# Patient Record
Sex: FEMALE | Race: OTHER | NOT HISPANIC OR LATINO
[De-identification: names, ages, dates, MRNs, and addresses within clinical notes are randomized per-mention and may not be internally consistent; named-entity substitution may affect disease eponyms.]

---

## 2022-04-27 ENCOUNTER — TRANSCRIPTION ENCOUNTER (OUTPATIENT)
Age: 77
End: 2022-04-27

## 2022-06-03 DIAGNOSIS — Z86.718 PERSONAL HISTORY OF OTHER VENOUS THROMBOSIS AND EMBOLISM: ICD-10-CM

## 2022-06-08 ENCOUNTER — APPOINTMENT (OUTPATIENT)
Dept: VASCULAR SURGERY | Facility: CLINIC | Age: 77
End: 2022-06-08

## 2022-06-08 ENCOUNTER — NON-APPOINTMENT (OUTPATIENT)
Age: 77
End: 2022-06-08

## 2022-06-08 ENCOUNTER — APPOINTMENT (OUTPATIENT)
Dept: HEART AND VASCULAR | Facility: CLINIC | Age: 77
End: 2022-06-08
Payer: MEDICARE

## 2022-06-08 VITALS — DIASTOLIC BLOOD PRESSURE: 80 MMHG | HEART RATE: 81 BPM | SYSTOLIC BLOOD PRESSURE: 122 MMHG

## 2022-06-08 VITALS
HEART RATE: 90 BPM | RESPIRATION RATE: 16 BRPM | WEIGHT: 200 LBS | TEMPERATURE: 97.4 F | DIASTOLIC BLOOD PRESSURE: 80 MMHG | OXYGEN SATURATION: 96 % | SYSTOLIC BLOOD PRESSURE: 114 MMHG

## 2022-06-08 DIAGNOSIS — Z86.79 PERSONAL HISTORY OF OTHER DISEASES OF THE CIRCULATORY SYSTEM: ICD-10-CM

## 2022-06-08 DIAGNOSIS — Z80.0 FAMILY HISTORY OF MALIGNANT NEOPLASM OF DIGESTIVE ORGANS: ICD-10-CM

## 2022-06-08 DIAGNOSIS — Z83.3 FAMILY HISTORY OF DIABETES MELLITUS: ICD-10-CM

## 2022-06-08 DIAGNOSIS — Z78.9 OTHER SPECIFIED HEALTH STATUS: ICD-10-CM

## 2022-06-08 PROCEDURE — 93000 ELECTROCARDIOGRAM COMPLETE: CPT

## 2022-06-08 PROCEDURE — 99203 OFFICE O/P NEW LOW 30 MIN: CPT

## 2022-06-08 PROCEDURE — 99205 OFFICE O/P NEW HI 60 MIN: CPT

## 2022-06-08 RX ORDER — ASCORBIC ACID 500 MG
TABLET ORAL
Refills: 0 | Status: ACTIVE | COMMUNITY

## 2022-06-08 RX ORDER — OMEGA-3/DHA/EPA/FISH OIL 300-1000MG
CAPSULE ORAL
Refills: 0 | Status: ACTIVE | COMMUNITY

## 2022-06-08 NOTE — ASSESSMENT
[FreeTextEntry1] : 77 yo female with an unprovoked PE and DVT. I recommended that she continue to take Xarelto 20 mg daily. She was instructed to make an appointment with a hematologist for a hypercoagulable work up. She will follow up for a repeat duplex after she is seen by hematology.\par \par Continue Xarelto 20 mg daily\par Compression stockings daily\par \par

## 2022-06-08 NOTE — REVIEW OF SYSTEMS
[Fever] : no fever [Chills] : no chills [Lower Ext Edema] : lower extremity edema [Limb Pain] : no limb pain [Limb Swelling] : limb swelling

## 2022-06-08 NOTE — HISTORY OF PRESENT ILLNESS
[FreeTextEntry1] : 77 yo female referred by [facility] for a DVT and PE. The patient reports that she developed shortness of breath while climbing stairs. She reports that she was seen in the ED and was found to have a DVT and a PE. She denies a personal or family history of DVT or PE. She reports a history of varicose veins and venous surgery. She denies a provoking event prior to the DVT. She denies a recent history of Covid infection or vaccination.

## 2022-06-08 NOTE — DATA REVIEWED
[FreeTextEntry1] : Venous Duplex - 4/22 - DVT of gastrocnemius and peroneal vein left\par \par CTA -4/22 - bilateral scattered PE

## 2022-06-08 NOTE — PHYSICAL EXAM
[JVD] : no jugular venous distention  [Normal Breath Sounds] : Normal breath sounds [Normal Rate and Rhythm] : normal rate and rhythm [2+] : left 2+ [Ankle Swelling (On Exam)] : present [Ankle Swelling Bilaterally] : bilaterally  [Varicose Veins Of Lower Extremities] : bilaterally [] : bilaterally [Ankle Swelling On The Right] : mild [Alert] : alert [Oriented to Person] : oriented to person [Oriented to Place] : oriented to place [Oriented to Time] : oriented to time [de-identified] : Awake and Alert [de-identified] : spider veins bl  [de-identified] : Appropriate

## 2022-06-09 ENCOUNTER — NON-APPOINTMENT (OUTPATIENT)
Age: 77
End: 2022-06-09

## 2022-06-09 PROBLEM — Z86.79 HISTORY OF RHEUMATIC FEVER: Status: RESOLVED | Noted: 2022-06-09 | Resolved: 2022-06-09

## 2022-06-09 PROBLEM — Z86.79 HISTORY OF CARDIAC MURMUR: Status: RESOLVED | Noted: 2022-06-09 | Resolved: 2022-06-09

## 2022-06-09 NOTE — HISTORY OF PRESENT ILLNESS
[FreeTextEntry1] : Debbie Malave is a 75 yo female who presents for CV evaluation.  She has been seen by dr Javier Nam in the past.  \par \par She denies cp, soib, pnd, orthopnea, edema, palp, or loc. \par \par In April 2022, she developed GRANT with stair climbing.  Evaluation at [facility] revealed DVT and PE.  \par \par She is active. She is compliant with meds.\par \par ECG today reveals NSR, low voltage, NS ST T changes\par \par Clinical hx reviewed in detail.  \par \par Recommendations:\par 1. continue current CV meds\par 2. exercise\par 3. Heme eval\par 4. Vasc surgery f/u\par 5. EXSE\par 6. CPET\par 7. carotid uplex

## 2022-06-09 NOTE — DISCUSSION/SUMMARY
[ECG Normal Variant] : ECG normal variant [Myocardial Ischemia] : myocardial ischemia [Hyperlipidemia] : hyperlipidemia [Diet Modification] : diet modification [Exercise] : exercise [Weight Loss] : weight loss [DVT] : deep vein thrombosis (DVT) of the lower extremity [Stable] : stable [None] : There are no changes in medication management [Exercise Rehab] : exercise rehabilitation [Patient] : the patient [de-identified] : pulm embolism

## 2022-06-22 ENCOUNTER — APPOINTMENT (OUTPATIENT)
Dept: VASCULAR SURGERY | Facility: CLINIC | Age: 77
End: 2022-06-22

## 2022-07-11 RX ORDER — AMOXICILLIN AND CLAVULANATE POTASSIUM 875; 125 MG/1; MG/1
875-125 TABLET, COATED ORAL
Qty: 20 | Refills: 0 | Status: COMPLETED | COMMUNITY
Start: 2022-04-11 | End: 2022-07-11

## 2022-07-11 RX ORDER — AMOXICILLIN 500 MG/1
500 CAPSULE ORAL
Qty: 21 | Refills: 0 | Status: COMPLETED | COMMUNITY
Start: 2022-01-19 | End: 2022-07-11

## 2022-07-12 ENCOUNTER — RESULT REVIEW (OUTPATIENT)
Age: 77
End: 2022-07-12

## 2022-07-12 ENCOUNTER — APPOINTMENT (OUTPATIENT)
Dept: HEMATOLOGY ONCOLOGY | Facility: CLINIC | Age: 77
End: 2022-07-12

## 2022-07-12 VITALS
HEART RATE: 73 BPM | OXYGEN SATURATION: 96 % | WEIGHT: 200 LBS | BODY MASS INDEX: 30.31 KG/M2 | TEMPERATURE: 98.7 F | SYSTOLIC BLOOD PRESSURE: 130 MMHG | HEIGHT: 68 IN | RESPIRATION RATE: 16 BRPM | DIASTOLIC BLOOD PRESSURE: 74 MMHG

## 2022-07-12 PROCEDURE — 36415 COLL VENOUS BLD VENIPUNCTURE: CPT

## 2022-07-12 PROCEDURE — 99205 OFFICE O/P NEW HI 60 MIN: CPT | Mod: 25

## 2022-07-12 RX ORDER — PREDNISONE 20 MG/1
20 TABLET ORAL
Qty: 15 | Refills: 0 | Status: COMPLETED | COMMUNITY
Start: 2022-04-11 | End: 2022-07-12

## 2022-07-12 RX ORDER — ACETAMINOPHEN AND CODEINE 300; 30 MG/1; MG/1
300-30 TABLET ORAL
Qty: 10 | Refills: 0 | Status: COMPLETED | COMMUNITY
Start: 2022-01-19 | End: 2022-07-12

## 2022-07-12 RX ORDER — PREDNISONE 50 MG/1
50 TABLET ORAL
Qty: 7 | Refills: 0 | Status: COMPLETED | COMMUNITY
Start: 2022-05-03 | End: 2022-07-12

## 2022-07-12 RX ORDER — PREDNISONE 10 MG/1
10 TABLET ORAL
Qty: 14 | Refills: 0 | Status: COMPLETED | COMMUNITY
Start: 2022-05-10 | End: 2022-07-12

## 2022-07-17 NOTE — REASON FOR VISIT
[Initial Consultation] : an initial consultation for [FreeTextEntry2] : hx of blood clots, DVT, bilateral PE

## 2022-07-17 NOTE — PHYSICAL EXAM
[Fully active, able to carry on all pre-disease performance without restriction] : Status 0 - Fully active, able to carry on all pre-disease performance without restriction [Normal] : affect appropriate [de-identified] : varicose veins

## 2022-07-17 NOTE — ASSESSMENT
[FreeTextEntry1] : 75 yo female referred for evaluation of DVT/ PE diagnosed April 2022.  LLE DVt - proximal gastrocnemius/ peroneal. \par \par No FH or personal h/o of DVT/ PE.  She never conceived, has adopted children. \par Denies weight loss. \par \par March 2022- drove to [place] x 2-[place] hours and was sitting there for few days while visiting family, which doesn’t seem to be strong provoking circumstances.\par Hypercoagulable work up ordered\par \par FH - mother and maternal aunt - pancreatic ca, mother- AA\par \par Patient offered genetic testing in view of two family members of pancreatic ca\par We discussed:\par Plan for genetic panel.  \par Reviewed with patient impact of positive vs negative results and that test might not be informative or . \par We discussed that blood related family members might be carrying the same genetic mutation.\par Test confidentiality. \par Options or limitations of medical surveillance and strategies for prevention after genetic testing results.\par Importance of sharing genetic test results with at-risk relatives they might benefit from this information. \par Plan for follow up after testing.\par

## 2022-07-17 NOTE — HISTORY OF PRESENT ILLNESS
[de-identified] : 76 year old female presents today for initial consultation of hypercoagulability, referred by Dr. Sena.  She presented to [facility] ER back in 2022 with complaints of shortness of breath while climbing stairs.  She was found to have both a DVT and a pulmonary embolus, she is currently on Xarelto 20mg daily.  Due to this being an unprovoked event she was referred for further workup.  She denies any recent or prolonged travel or injury prior to this even occurring.  She denies any history of clotting in the past, or any major surgical procedures.  \par \par Venous Duplex -  - DVT of gastrocnemius and peroneal vein left\par CTA - - bilateral scattered PE. \par \par Family Hx-\par Denies any family hx of thrombosis or frequent miscarriages, never got pregnant\par Mother with pancreatic cancer-  at 77\par Maternal Aunt - pancreatic ca 70 \par Sister with a "abdominal" cancer diagnosed 82- doing well\par \par Social Hx-\par Denies Smoking Hx\par Social ETOH use\par :

## 2022-07-25 ENCOUNTER — APPOINTMENT (OUTPATIENT)
Dept: HEART AND VASCULAR | Facility: CLINIC | Age: 77
End: 2022-07-25

## 2022-07-29 ENCOUNTER — APPOINTMENT (OUTPATIENT)
Dept: HEART AND VASCULAR | Facility: CLINIC | Age: 77
End: 2022-07-29
Payer: MEDICARE

## 2022-07-29 VITALS
WEIGHT: 200 LBS | BODY MASS INDEX: 30.31 KG/M2 | HEART RATE: 64 BPM | DIASTOLIC BLOOD PRESSURE: 70 MMHG | OXYGEN SATURATION: 96 % | SYSTOLIC BLOOD PRESSURE: 114 MMHG | HEIGHT: 68 IN

## 2022-07-29 PROCEDURE — 93320 DOPPLER ECHO COMPLETE: CPT

## 2022-07-29 PROCEDURE — 93880 EXTRACRANIAL BILAT STUDY: CPT

## 2022-07-29 PROCEDURE — 93351 STRESS TTE COMPLETE: CPT

## 2022-07-29 PROCEDURE — 93325 DOPPLER ECHO COLOR FLOW MAPG: CPT

## 2022-08-05 ENCOUNTER — NON-APPOINTMENT (OUTPATIENT)
Age: 77
End: 2022-08-05

## 2022-08-11 ENCOUNTER — APPOINTMENT (OUTPATIENT)
Dept: HEMATOLOGY ONCOLOGY | Facility: CLINIC | Age: 77
End: 2022-08-11

## 2022-08-11 ENCOUNTER — RESULT REVIEW (OUTPATIENT)
Age: 77
End: 2022-08-11

## 2022-08-11 VITALS
HEART RATE: 71 BPM | BODY MASS INDEX: 30.13 KG/M2 | RESPIRATION RATE: 16 BRPM | OXYGEN SATURATION: 97 % | HEIGHT: 68 IN | WEIGHT: 198.8 LBS | TEMPERATURE: 98.8 F | SYSTOLIC BLOOD PRESSURE: 124 MMHG | DIASTOLIC BLOOD PRESSURE: 62 MMHG

## 2022-08-11 DIAGNOSIS — Z13.79 ENCOUNTER FOR OTHER SCREENING FOR GENETIC AND CHROMOSOMAL ANOMALIES: ICD-10-CM

## 2022-08-11 PROCEDURE — 36415 COLL VENOUS BLD VENIPUNCTURE: CPT

## 2022-08-11 PROCEDURE — 99214 OFFICE O/P EST MOD 30 MIN: CPT | Mod: 25

## 2022-08-11 RX ORDER — FLUTICASONE PROPIONATE 50 UG/1
50 SPRAY, METERED NASAL
Qty: 48 | Refills: 0 | Status: COMPLETED | COMMUNITY
Start: 2022-05-11 | End: 2022-08-11

## 2022-08-11 RX ORDER — CHLORHEXIDINE GLUCONATE, 0.12% ORAL RINSE 1.2 MG/ML
0.12 SOLUTION DENTAL
Qty: 473 | Refills: 0 | Status: COMPLETED | COMMUNITY
Start: 2022-01-19 | End: 2022-08-11

## 2022-08-14 PROBLEM — Z13.79 GENETIC TESTING: Status: ACTIVE | Noted: 2022-07-17

## 2022-08-14 NOTE — HISTORY OF PRESENT ILLNESS
[de-identified] : 76 year old female presents today for initial consultation of hypercoagulability, referred by Dr. Sena.  She presented to [facility] ER back in 2022 with complaints of shortness of breath while climbing stairs.  She was found to have both a DVT and a pulmonary embolus, she is currently on Xarelto 20mg daily.  Due to this being an unprovoked event she was referred for further workup.  She denies any recent or prolonged travel or injury prior to this even occurring.  She denies any history of clotting in the past, or any major surgical procedures.  \par \par Venous Duplex -  - DVT of gastrocnemius and peroneal vein left\par CTA - - bilateral scattered PE. \par \par Family Hx-\par Denies any family hx of thrombosis or frequent miscarriages, never got pregnant\par Mother with pancreatic cancer-  at 77\par Maternal Aunt - pancreatic ca 70 \par Sister with a "abdominal" cancer diagnosed 82- doing well\par \par Social Hx-\par Denies Smoking Hx\par Social ETOH use\par :

## 2022-08-14 NOTE — ASSESSMENT
[FreeTextEntry1] : 77 yo female referred for evaluation of DVT/ PE diagnosed April 2022.  LLE DVt - proximal gastrocnemius/ peroneal. Patient had bilateral PE, multiple scattered/ subsegmental. \par \par No FH or personal h/o of DVT/ PE.  She never conceived, has adopted children. \par Denies weight loss. \par \par March 2022- drove to [place] x [place] hours and was sitting there for few days while visiting family, which doesn’t seem to be strong provoking circumstances. During todays - 2nd visit she recalls being SOB before the trip to [place]. \par Hypercoagulable work up positive for diluted Brad viper venom time - patient on Xarelto, will need to repeat results when off Xarelto.\par DAVID IgG positive - 18.1 ( 2nd test).\par \par Patient scheduled for cardiac cath following positive stress test. \par Follow up labs after catheterization done. \par will require window off AC to repeat LAC \par RTC 3 months \par \par FH - mother and maternal aunt - pancreatic ca, mother- AA\par Genetic testing negative - reviewed report and implication with patient

## 2022-08-14 NOTE — PHYSICAL EXAM
[Fully active, able to carry on all pre-disease performance without restriction] : Status 0 - Fully active, able to carry on all pre-disease performance without restriction [Normal] : affect appropriate [de-identified] : varicose veins

## 2022-08-23 ENCOUNTER — RESULT REVIEW (OUTPATIENT)
Age: 77
End: 2022-08-23

## 2022-08-26 ENCOUNTER — RESULT REVIEW (OUTPATIENT)
Age: 77
End: 2022-08-26

## 2022-09-01 ENCOUNTER — APPOINTMENT (OUTPATIENT)
Dept: HEART AND VASCULAR | Facility: CLINIC | Age: 77
End: 2022-09-01

## 2022-09-01 VITALS
HEIGHT: 68 IN | TEMPERATURE: 98 F | OXYGEN SATURATION: 97 % | WEIGHT: 200 LBS | DIASTOLIC BLOOD PRESSURE: 70 MMHG | RESPIRATION RATE: 16 BRPM | SYSTOLIC BLOOD PRESSURE: 122 MMHG | HEART RATE: 71 BPM | BODY MASS INDEX: 30.31 KG/M2

## 2022-09-01 DIAGNOSIS — R94.39 ABNORMAL RESULT OF OTHER CARDIOVASCULAR FUNCTION STUDY: ICD-10-CM

## 2022-09-01 PROCEDURE — 99215 OFFICE O/P EST HI 40 MIN: CPT

## 2022-09-01 RX ORDER — ERYTHROMYCIN 500 MG/1
TABLET, FILM COATED ORAL
Refills: 0 | Status: DISCONTINUED | COMMUNITY
End: 2022-09-01

## 2022-09-05 PROBLEM — R94.39 ABNORMAL STRESS ECHO: Status: ACTIVE | Noted: 2022-08-04

## 2022-09-05 NOTE — REASON FOR VISIT
[Arrhythmia/ECG Abnorrmalities] : arrhythmia/ECG abnormalities [Structural Heart and Valve Disease] : structural heart and valve disease [Hyperlipidemia] : hyperlipidemia [Coronary Artery Disease] : coronary artery disease [Carotid, Aortic and Peripheral Vascular Disease] : carotid, aortic and peripheral vascular disease [FreeTextEntry3] : Jorge Garcia

## 2022-09-05 NOTE — PHYSICAL EXAM

## 2022-09-05 NOTE — HISTORY OF PRESENT ILLNESS
[FreeTextEntry1] : Debbie Malave returns for follow up.   She has been seen by Dr Suresh Nam in the past.  \par \par In April 2022, she developed GRANT with stair climbing.  Evaluation at [facility] revealed DVT and PE.  \par \par Today, she denies cp, osb, pnd, orthopnea, edema, palp, or loc.\par \par She is active. 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  \par \par Recommendations:\par 1. continue current CV meds\par 2. exercise\par 3. Mediterranean diet\par 4. f/u in 3 months

## 2022-11-08 ENCOUNTER — RESULT REVIEW (OUTPATIENT)
Age: 77
End: 2022-11-08

## 2022-11-08 ENCOUNTER — APPOINTMENT (OUTPATIENT)
Dept: HEMATOLOGY ONCOLOGY | Facility: CLINIC | Age: 77
End: 2022-11-08

## 2022-11-08 VITALS
HEART RATE: 69 BPM | HEIGHT: 68 IN | WEIGHT: 197.4 LBS | DIASTOLIC BLOOD PRESSURE: 71 MMHG | TEMPERATURE: 97.6 F | RESPIRATION RATE: 16 BRPM | OXYGEN SATURATION: 100 % | BODY MASS INDEX: 29.92 KG/M2 | SYSTOLIC BLOOD PRESSURE: 150 MMHG

## 2022-11-08 PROCEDURE — 36415 COLL VENOUS BLD VENIPUNCTURE: CPT

## 2022-11-08 PROCEDURE — 99214 OFFICE O/P EST MOD 30 MIN: CPT | Mod: 25

## 2022-11-08 NOTE — PHYSICAL EXAM
[Fully active, able to carry on all pre-disease performance without restriction] : Status 0 - Fully active, able to carry on all pre-disease performance without restriction [Normal] : affect appropriate [de-identified] : varicose veins

## 2022-11-08 NOTE — HISTORY OF PRESENT ILLNESS
[de-identified] : 76 year old female presents today for initial consultation of hypercoagulability, referred by Dr. Sena.  She presented to [facility] ER back in 2022 with complaints of shortness of breath while climbing stairs.  She was found to have both a DVT and a pulmonary embolus, she is currently on Xarelto 20mg daily.  Due to this being an unprovoked event she was referred for further workup.  She denies any recent or prolonged travel or injury prior to this even occurring.  She denies any history of clotting in the past, or any major surgical procedures.  \par \par Venous Duplex -  - DVT of gastrocnemius and peroneal vein left\par CTA - - bilateral scattered PE. \par \par Family Hx-\par Denies any family hx of thrombosis or frequent miscarriages, never got pregnant\par Mother with pancreatic cancer-  at 77\par Maternal Aunt - pancreatic ca 70 \par Sister with a "abdominal" cancer diagnosed 82- doing well\par \par Social Hx-\par Denies Smoking Hx\par Social ETOH use\par :

## 2022-11-15 ENCOUNTER — APPOINTMENT (OUTPATIENT)
Dept: HEMATOLOGY ONCOLOGY | Facility: CLINIC | Age: 77
End: 2022-11-15

## 2022-11-15 ENCOUNTER — RESULT REVIEW (OUTPATIENT)
Age: 77
End: 2022-11-15

## 2022-11-15 VITALS
TEMPERATURE: 98.4 F | HEART RATE: 87 BPM | BODY MASS INDEX: 29.66 KG/M2 | RESPIRATION RATE: 16 BRPM | WEIGHT: 195.7 LBS | DIASTOLIC BLOOD PRESSURE: 80 MMHG | SYSTOLIC BLOOD PRESSURE: 136 MMHG | HEIGHT: 68 IN | OXYGEN SATURATION: 99 %

## 2022-11-29 ENCOUNTER — APPOINTMENT (OUTPATIENT)
Dept: HEMATOLOGY ONCOLOGY | Facility: CLINIC | Age: 77
End: 2022-11-29

## 2022-11-29 PROCEDURE — 99214 OFFICE O/P EST MOD 30 MIN: CPT | Mod: 25,95

## 2022-12-02 ENCOUNTER — APPOINTMENT (OUTPATIENT)
Dept: HEART AND VASCULAR | Facility: CLINIC | Age: 77
End: 2022-12-02

## 2022-12-15 ENCOUNTER — APPOINTMENT (OUTPATIENT)
Dept: HEART AND VASCULAR | Facility: CLINIC | Age: 77
End: 2022-12-15

## 2022-12-15 VITALS
OXYGEN SATURATION: 98 % | SYSTOLIC BLOOD PRESSURE: 112 MMHG | HEIGHT: 68 IN | WEIGHT: 189 LBS | BODY MASS INDEX: 28.64 KG/M2 | TEMPERATURE: 97.7 F | HEART RATE: 89 BPM | DIASTOLIC BLOOD PRESSURE: 80 MMHG | RESPIRATION RATE: 16 BRPM

## 2022-12-15 PROCEDURE — 99215 OFFICE O/P EST HI 40 MIN: CPT

## 2022-12-17 NOTE — PHYSICAL EXAM

## 2022-12-17 NOTE — HISTORY OF PRESENT ILLNESS
[FreeTextEntry1] : Debbie Malave returns for follow up.   \par \par She has been seen by Dr Suresh Nam in the past.  \par \par In April 2022, she developed GRANT with stair climbing.  Evaluation at [facility] revealed DVT and PE.  Heme followup and care plan noted.\par \par Today, she denies cp, sob, pnd, orthopnea, edema, palp, or loc.\par \par She is active. 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  \par \par Recommendations:\par 1. continue current CV meds - off Xarelto as per Heme for now\par 2. exercise\par 3. Mediterranean diet\par 4. f/u in 6 months

## 2022-12-17 NOTE — DISCUSSION/SUMMARY
[ECG Normal Variant] : ECG normal variant [Myocardial Ischemia] : myocardial ischemia [Coronary Artery Disease] : coronary artery disease [Dietary Modification] : dietary modification [Exercise Regimen] : an exercise regimen [Hyperlipidemia] : hyperlipidemia [Diet Modification] : diet modification [Exercise] : exercise [Weight Loss] : weight loss [Mild Mitral Regurgitation] : mild mitral regurgitation [Compensated] : compensated [Sodium Restriction] : sodium restriction [DVT] : deep vein thrombosis (DVT) of the lower extremity [Stable] : stable [None] : There are no changes in medication management [Exercise Rehab] : exercise rehabilitation [Patient] : the patient [de-identified] : nonobstructive CAD by cath 8/2022; abnl EXSE 7/2022 [de-identified] : pulm embolism; mild carotid artery disease 7/2022 [FreeTextEntry1] : TR - mild

## 2022-12-19 NOTE — HISTORY OF PRESENT ILLNESS
[Home] : at home, [unfilled] , at the time of the visit. [Medical Office: ([facility])___] : at the medical office located in  [Verbal consent obtained from patient] : the patient, [unfilled] [de-identified] : 76 year old female presents today for initial consultation of hypercoagulability, referred by Dr. Sena.  She presented to [facility] ER back in 2022 with complaints of shortness of breath while climbing stairs.  She was found to have both a DVT and a pulmonary embolus, she is currently on Xarelto 20mg daily.  Due to this being an unprovoked event she was referred for further workup.  She denies any recent or prolonged travel or injury prior to this even occurring.  She denies any history of clotting in the past, or any major surgical procedures.  \par \par Venous Duplex -  - DVT of gastrocnemius and peroneal vein left\par CTA - - bilateral scattered PE. \par \par Family Hx-\par Denies any family hx of thrombosis or frequent miscarriages, never got pregnant\par Mother with pancreatic cancer-  at 77\par Maternal Aunt - pancreatic ca 70 \par Sister with a "abdominal" cancer diagnosed 82- doing well\par \par Social Hx-\par Denies Smoking Hx\par Social ETOH use\par :

## 2022-12-19 NOTE — PHYSICAL EXAM
[Fully active, able to carry on all pre-disease performance without restriction] : Status 0 - Fully active, able to carry on all pre-disease performance without restriction [Normal] : affect appropriate [de-identified] : varicose veins

## 2022-12-19 NOTE — ASSESSMENT
[FreeTextEntry1] : 77 yo female referred for evaluation of DVT/ PE diagnosed April 2022.  LLE DVt - proximal gastrocnemius/ peroneal. Patient had bilateral PE, multiple scattered/ subsegmental. \par \par No FH or personal h/o of DVT/ PE.  She never conceived, has adopted children. \par Denies weight loss. \par \par March 2022- drove to [place] x [place] hours and was sitting there for few days while visiting family, which doesn’t seem to be strong provoking circumstances. During todays - 2nd visit she recalls being SOB before the trip to [place]. \par Hypercoagulable work up positive for diluted Brad viper venom time - patient on Xarelto, will need to repeat results when off Xarelto.\par DAVID IgG positive - 18.1 ( 2nd test).\par \par Cardiac catheterization done. \par \par Patient 7 months on AC - LAC x 2 positive and positive DAVID- stop Xarelto x 5 days - repeat blood work, resume after.  Set up telehealth after to review results.\par \par Reviewed with patient - if positive for LAC will need to stay on AC, she is hesitant to change to warfarin, but since responded well to Xarelto might stay on it. \par will require window off AC to repeat LAC \par \par FH - mother and maternal aunt - pancreatic ca, mother- AA\par Genetic testing negative - reviewed report and implication with patient

## 2023-01-10 ENCOUNTER — RESULT REVIEW (OUTPATIENT)
Age: 78
End: 2023-01-10

## 2023-01-10 ENCOUNTER — APPOINTMENT (OUTPATIENT)
Dept: HEMATOLOGY ONCOLOGY | Facility: CLINIC | Age: 78
End: 2023-01-10

## 2023-01-10 VITALS
DIASTOLIC BLOOD PRESSURE: 70 MMHG | WEIGHT: 188.8 LBS | OXYGEN SATURATION: 100 % | RESPIRATION RATE: 16 BRPM | HEIGHT: 68 IN | HEART RATE: 91 BPM | SYSTOLIC BLOOD PRESSURE: 135 MMHG | BODY MASS INDEX: 28.61 KG/M2 | TEMPERATURE: 97.5 F

## 2023-01-23 ENCOUNTER — APPOINTMENT (OUTPATIENT)
Dept: HEMATOLOGY ONCOLOGY | Facility: CLINIC | Age: 78
End: 2023-01-23
Payer: MEDICARE

## 2023-01-23 VITALS
RESPIRATION RATE: 16 BRPM | SYSTOLIC BLOOD PRESSURE: 128 MMHG | TEMPERATURE: 97.7 F | HEIGHT: 68 IN | HEART RATE: 74 BPM | DIASTOLIC BLOOD PRESSURE: 63 MMHG | WEIGHT: 191.13 LBS | OXYGEN SATURATION: 99 % | BODY MASS INDEX: 28.97 KG/M2

## 2023-01-23 PROCEDURE — 99214 OFFICE O/P EST MOD 30 MIN: CPT

## 2023-01-23 NOTE — HISTORY OF PRESENT ILLNESS
[Home] : at home, [unfilled] , at the time of the visit. [Medical Office: ([facility])___] : at the medical office located in  [Verbal consent obtained from patient] : the patient, [unfilled] [de-identified] : 76 year old female presents today for initial consultation of hypercoagulability, referred by Dr. Sena.  She presented to [facility] ER back in 2022 with complaints of shortness of breath while climbing stairs.  She was found to have both a DVT and a pulmonary embolus, she is currently on Xarelto 20mg daily.  Due to this being an unprovoked event she was referred for further workup.  She denies any recent or prolonged travel or injury prior to this even occurring.  She denies any history of clotting in the past, or any major surgical procedures.  \par \par Venous Duplex -  - DVT of gastrocnemius and peroneal vein left\par CTA - - bilateral scattered PE. \par \par Family Hx-\par Denies any family hx of thrombosis or frequent miscarriages, never got pregnant\par Mother with pancreatic cancer-  at 77\par Maternal Aunt - pancreatic ca 70 \par Sister with a "abdominal" cancer diagnosed 82- doing well\par \par Social Hx-\par Denies Smoking Hx\par Social ETOH use\par :

## 2023-01-23 NOTE — ASSESSMENT
[FreeTextEntry1] : 77 yo female referred for evaluation of DVT/ PE diagnosed April 2022.  LLE DVt - proximal gastrocnemius/ peroneal. Patient had bilateral PE, multiple scattered/ subsegmental. \par \par No FH or personal h/o of DVT/ PE.  She never conceived, has adopted children. \par Denies weight loss. \par \par March 2022- drove to [place] x [place] hours and was sitting there for few days while visiting family, which doesn’t seem to be strong provoking circumstances. During todays - 2nd visit she recalls being SOB before the trip to [place]. \par Hypercoagulable work up positive for diluted Brad viper venom time - patient on Xarelto, will need to repeat results when off Xarelto.\par DAVID IgG positive - 18.1 ( 2nd test).\par \par Cardiac catheterization done. \par \par Patient 7 months on AC - LAC x 2 positive and positive DAVID- stop Xarelto x 5 days - repeat blood work, resume after.  Set up telehealth after to review results.\par \par Reviewed with patient - if positive for LAC will need to stay on AC, she is hesitant to change to warfarin, but since responded well to Xarelto might stay on it. \par will require window off AC to repeat LAC \par \par LAC positive, DAVID - low titer, - reviewed with patient to resume AC.  D/w pt - better effectiveness of Warfarin with LAC, on the other hand she has low titer and did well with xarelto, resume Xarelto.\par \par # FH - mother and maternal aunt - pancreatic ca, mother- AA\par Genetic testing negative - reviewed report and implication with patient

## 2023-01-23 NOTE — PHYSICAL EXAM
[Fully active, able to carry on all pre-disease performance without restriction] : Status 0 - Fully active, able to carry on all pre-disease performance without restriction [Normal] : affect appropriate [de-identified] : varicose veins

## 2023-06-19 ENCOUNTER — APPOINTMENT (OUTPATIENT)
Dept: HEART AND VASCULAR | Facility: CLINIC | Age: 78
End: 2023-06-19
Payer: MEDICARE

## 2023-06-19 VITALS
SYSTOLIC BLOOD PRESSURE: 122 MMHG | HEIGHT: 68 IN | OXYGEN SATURATION: 98 % | WEIGHT: 187 LBS | BODY MASS INDEX: 28.34 KG/M2 | DIASTOLIC BLOOD PRESSURE: 70 MMHG | RESPIRATION RATE: 16 BRPM | HEART RATE: 76 BPM | TEMPERATURE: 97.6 F

## 2023-06-19 PROCEDURE — 99215 OFFICE O/P EST HI 40 MIN: CPT

## 2023-06-21 NOTE — DISCUSSION/SUMMARY
[ECG Normal Variant] : ECG normal variant [Myocardial Ischemia] : myocardial ischemia [Coronary Artery Disease] : coronary artery disease [Dietary Modification] : dietary modification [Exercise Regimen] : an exercise regimen [Hyperlipidemia] : hyperlipidemia [Diet Modification] : diet modification [Exercise] : exercise [Weight Loss] : weight loss [Mild Mitral Regurgitation] : mild mitral regurgitation [Compensated] : compensated [Sodium Restriction] : sodium restriction [DVT] : deep vein thrombosis (DVT) of the lower extremity [Stable] : stable [None] : There are no changes in medication management [Exercise Rehab] : exercise rehabilitation [Patient] : the patient [de-identified] : nonobstructive CAD by cath 8/2022; abnl EXSE 7/2022 [de-identified] : pulm embolism; mild carotid artery disease 7/2022 [FreeTextEntry1] : TR - mild

## 2023-06-21 NOTE — PHYSICAL EXAM

## 2023-06-21 NOTE — HISTORY OF PRESENT ILLNESS
[FreeTextEntry1] : Debbie Malave returns for follow up.   \par \par She has been seen by Dr Suresh Nam in the past.  \par \par For the last several weeks, she has been addressing anemia and GI bleed issues.  Her fatigue and GRANT are slowly getting better.\par \par Today, she denies cp, sob, pnd, orthopnea, edema, palp, or loc.\par \par She is active. 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  \par \par Recommendations:\par 1. continue current CV meds\par 2. exercise\par 3. Mediterranean diet\par 4. collect GI records\par 5. f/u in 4 months

## 2023-06-21 NOTE — REASON FOR VISIT
[Arrhythmia/ECG Abnorrmalities] : arrhythmia/ECG abnormalities [Structural Heart and Valve Disease] : structural heart and valve disease [Hyperlipidemia] : hyperlipidemia [Coronary Artery Disease] : coronary artery disease [Carotid, Aortic and Peripheral Vascular Disease] : carotid, aortic and peripheral vascular disease [FreeTextEntry3] : Jorge Garcai

## 2023-06-22 ENCOUNTER — NON-APPOINTMENT (OUTPATIENT)
Age: 78
End: 2023-06-22

## 2023-07-24 ENCOUNTER — RESULT REVIEW (OUTPATIENT)
Age: 78
End: 2023-07-24

## 2023-07-24 ENCOUNTER — APPOINTMENT (OUTPATIENT)
Dept: HEMATOLOGY ONCOLOGY | Facility: CLINIC | Age: 78
End: 2023-07-24
Payer: MEDICARE

## 2023-07-24 VITALS
BODY MASS INDEX: 28.72 KG/M2 | HEART RATE: 70 BPM | WEIGHT: 189.5 LBS | RESPIRATION RATE: 16 BRPM | TEMPERATURE: 97.9 F | SYSTOLIC BLOOD PRESSURE: 135 MMHG | HEIGHT: 68 IN | DIASTOLIC BLOOD PRESSURE: 59 MMHG | OXYGEN SATURATION: 100 %

## 2023-07-24 PROCEDURE — 36415 COLL VENOUS BLD VENIPUNCTURE: CPT

## 2023-07-24 PROCEDURE — 99213 OFFICE O/P EST LOW 20 MIN: CPT | Mod: 25

## 2023-07-24 NOTE — ASSESSMENT
[FreeTextEntry1] : 76 yo female referred for evaluation of DVT/ PE diagnosed April 2022.  LLE DVt - proximal gastrocnemius/ peroneal. Patient had bilateral PE, multiple scattered/ subsegmental. \par \par No FH or personal h/o of DVT/ PE.  She never conceived, has adopted children. \par Denies weight loss. \par \par March 2022- drove to [place] x [place] hours and was sitting there for few days while visiting family, which doesn’t seem to be strong provoking circumstances. During todays - 2nd visit she recalls being SOB before the trip to [place]. \par Hypercoagulable work up positive for diluted Brad viper venom time - patient on Xarelto, will need to repeat results when off Xarelto.\par DAVID IgG positive - 18.1 ( 2nd test).\par \par Cardiac catheterization done. \par \par Patient 7 months on AC - LAC x 2 positive and positive ADVID- stop Xarelto x 5 days - repeat blood work, resume after.  Set up telehealth after to review results.\par \par Reviewed with patient - if positive for LAC will need to stay on AC, she is hesitant to change to warfarin, but since responded well to Xarelto might stay on it. \par will require window off AC to repeat LAC \par \par LAC positive, DAVID - low titer, - reviewed with patient to resume AC.  D/w pt - better effectiveness of Warfarin with LAC, on the other hand she has low titer and did well with xarelto, resume Xarelto- Repeat in July 2023, to determine if pt should begin Coumadin versus xarelto (she is traveling until Aug18 2023)- done today \par \par #anemia- Hgb 9.6, s/p GI bleed in May 2023. S/p EGD, colonoscopy and capsule study.  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  \par \par draw anemia panel today \par \par # FH - mother and maternal aunt - pancreatic ca, mother- AA\par Genetic testing negative - reviewed report and implication with patient\par \par Case and mgmt discussed with Dr. Bradley

## 2023-07-24 NOTE — PHYSICAL EXAM
[Fully active, able to carry on all pre-disease performance without restriction] : Status 0 - Fully active, able to carry on all pre-disease performance without restriction [Normal] : affect appropriate [de-identified] : varicose veins

## 2023-07-24 NOTE — HISTORY OF PRESENT ILLNESS
[de-identified] : Pt presents today for follow up of PE - on xarelto, now with some anemia, post GI bleed.   [de-identified] : 76 year old female presents today for initial consultation of hypercoagulability, referred by Dr. Sena.  She presented to [facility] ER back in 2022 with complaints of shortness of breath while climbing stairs.  She was found to have both a DVT and a pulmonary embolus, she is currently on Xarelto 20mg daily.  Due to this being an unprovoked event she was referred for further workup.  She denies any recent or prolonged travel or injury prior to this even occurring.  She denies any history of clotting in the past, or any major surgical procedures.  \par \par Venous Duplex -  - DVT of gastrocnemius and peroneal vein left\par CTA - - bilateral scattered PE. \par \par Family Hx-\par Denies any family hx of thrombosis or frequent miscarriages, never got pregnant\par Mother with pancreatic cancer-  at 77\par Maternal Aunt - pancreatic ca 70 \par Sister with a "abdominal" cancer diagnosed 82- doing well\par \par Social Hx-\par Denies Smoking Hx\par Social ETOH use\par :

## 2023-08-21 ENCOUNTER — APPOINTMENT (OUTPATIENT)
Dept: HEMATOLOGY ONCOLOGY | Facility: CLINIC | Age: 78
End: 2023-08-21
Payer: MEDICARE

## 2023-08-21 PROCEDURE — 99214 OFFICE O/P EST MOD 30 MIN: CPT | Mod: 95

## 2023-08-21 NOTE — PHYSICAL EXAM
[Fully active, able to carry on all pre-disease performance without restriction] : Status 0 - Fully active, able to carry on all pre-disease performance without restriction [Normal] : affect appropriate [de-identified] : varicose veins

## 2023-08-21 NOTE — REASON FOR VISIT
[Follow-Up Visit] : a follow-up visit for [Home] : at home, [unfilled] , at the time of the visit. [Medical Office: ([facility])___] : at the medical office located in  [FreeTextEntry2] : hx of blood clots, DVT, bilateral PE

## 2023-08-21 NOTE — HISTORY OF PRESENT ILLNESS
[de-identified] : Pt presents today for follow up of PE - on xarelto, now with some anemia, post GI bleed.   polyp  [de-identified] : 76 year old female presents today for initial consultation of hypercoagulability, referred by Dr. Sena.  She presented to [facility] ER back in 2022 with complaints of shortness of breath while climbing stairs.  She was found to have both a DVT and a pulmonary embolus, she is currently on Xarelto 20mg daily.  Due to this being an unprovoked event she was referred for further workup.  She denies any recent or prolonged travel or injury prior to this even occurring.  She denies any history of clotting in the past, or any major surgical procedures.  \par  \par  Venous Duplex -  - DVT of gastrocnemius and peroneal vein left\par  CTA - - bilateral scattered PE. \par  \par  Family Hx-\par  Denies any family hx of thrombosis or frequent miscarriages, never got pregnant\par  Mother with pancreatic cancer-  at 77\par  Maternal Aunt - pancreatic ca 70 \par  Sister with a "abdominal" cancer diagnosed 82- doing well\par  \par  Social Hx-\par  Denies Smoking Hx\par  Social ETOH use\par  :

## 2023-08-21 NOTE — ASSESSMENT
[FreeTextEntry1] : 78 yo female referred for evaluation of DVT/ PE diagnosed April 2022.  LLE DVt - proximal gastrocnemius/ peroneal. Patient had bilateral PE, multiple scattered/ subsegmental.   No FH or personal h/o of DVT/ PE.  She never conceived, has adopted children.  Denies weight loss.   March 2022- drove to [place] x 2-3 hours and was sitting there for few days while visiting family, which doesn't seem to be strong provoking circumstances. During todays - 2nd visit she recalls being SOB before the trip to [place].  Hypercoagulable work up positive for diluted Brad viper venom time - patient on Xarelto, will need to repeat results when off Xarelto. DAVID IgG positive - 18.1 ( 2nd test).  Cardiac catheterization done.   Patient 7 months on AC - LAC x 2 positive and positive DAVID- stop Xarelto x 5 days - repeat blood work, resume after.  Set up telehealth after to review results.  Reviewed with patient - if positive for LAC will need to stay on AC, she is hesitant to change to warfarin, but since responded well to Xarelto might stay on it.  will require window off AC to repeat LAC   LAC positive, DAVID - low titer, - reviewed with patient to resume AC.  She will remain on Xarelto. In vewi of recent GI bleeding decrease dose to 10 mg - LAC still present - in view of recent bleeding will change to Xarelto 10 mg daily   #anemia- Hgb 9.6, s/p GI bleed in May 2023. S/p EGD, colonoscopy and capsule study.  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   H.pylori- finished abx.  # Anemia of blood loss - start oral iron ferrous gluconate   # vit B12 deficiency - start B12 1000 mcg.    check labs in 3 m  # FH - mother and maternal aunt - pancreatic ca, mother- AA Genetic testing negative - reviewed report and implication with patient  Case and mgmt discussed with Dr. Bradley

## 2023-10-05 ENCOUNTER — NON-APPOINTMENT (OUTPATIENT)
Age: 78
End: 2023-10-05

## 2023-10-05 ENCOUNTER — APPOINTMENT (OUTPATIENT)
Dept: HEART AND VASCULAR | Facility: CLINIC | Age: 78
End: 2023-10-05
Payer: MEDICARE

## 2023-10-05 VITALS
SYSTOLIC BLOOD PRESSURE: 126 MMHG | HEART RATE: 99 BPM | DIASTOLIC BLOOD PRESSURE: 60 MMHG | BODY MASS INDEX: 27.89 KG/M2 | RESPIRATION RATE: 16 BRPM | OXYGEN SATURATION: 97 % | HEIGHT: 68 IN | WEIGHT: 184 LBS

## 2023-10-05 PROCEDURE — XXXXX: CPT | Mod: 1L

## 2023-10-23 ENCOUNTER — APPOINTMENT (OUTPATIENT)
Dept: HEART AND VASCULAR | Facility: CLINIC | Age: 78
End: 2023-10-23
Payer: MEDICARE

## 2023-10-23 VITALS
DIASTOLIC BLOOD PRESSURE: 72 MMHG | TEMPERATURE: 98 F | HEIGHT: 68 IN | RESPIRATION RATE: 16 BRPM | BODY MASS INDEX: 27.89 KG/M2 | WEIGHT: 184 LBS | SYSTOLIC BLOOD PRESSURE: 130 MMHG | OXYGEN SATURATION: 98 % | HEART RATE: 74 BPM

## 2023-10-23 DIAGNOSIS — I34.0 NONRHEUMATIC MITRAL (VALVE) INSUFFICIENCY: ICD-10-CM

## 2023-10-23 DIAGNOSIS — B96.81 GASTRITIS, UNSPECIFIED, W/OUT BLEEDING: ICD-10-CM

## 2023-10-23 DIAGNOSIS — I25.10 ATHEROSCLEROTIC HEART DISEASE OF NATIVE CORONARY ARTERY W/OUT ANGINA PECTORIS: ICD-10-CM

## 2023-10-23 DIAGNOSIS — K29.70 GASTRITIS, UNSPECIFIED, W/OUT BLEEDING: ICD-10-CM

## 2023-10-23 DIAGNOSIS — I77.9 DISORDER OF ARTERIES AND ARTERIOLES, UNSPECIFIED: ICD-10-CM

## 2023-10-23 DIAGNOSIS — E78.5 HYPERLIPIDEMIA, UNSPECIFIED: ICD-10-CM

## 2023-10-23 DIAGNOSIS — I07.1 RHEUMATIC TRICUSPID INSUFFICIENCY: ICD-10-CM

## 2023-10-23 DIAGNOSIS — R00.2 PALPITATIONS: ICD-10-CM

## 2023-10-23 DIAGNOSIS — R94.31 ABNORMAL ELECTROCARDIOGRAM [ECG] [EKG]: ICD-10-CM

## 2023-10-23 PROCEDURE — 99215 OFFICE O/P EST HI 40 MIN: CPT | Mod: 25

## 2023-10-23 PROCEDURE — 93000 ELECTROCARDIOGRAM COMPLETE: CPT

## 2023-10-23 RX ORDER — PNV NO.95/FERROUS FUM/FOLIC AC 28MG-0.8MG
TABLET ORAL
Refills: 0 | Status: ACTIVE | COMMUNITY

## 2023-10-29 PROBLEM — I07.1 MILD TRICUSPID INSUFFICIENCY: Status: ACTIVE | Noted: 2022-08-04

## 2023-10-29 PROBLEM — E78.5 HYPERLIPIDEMIA: Status: ACTIVE | Noted: 2022-06-09

## 2023-10-29 PROBLEM — I77.9 CAROTID ARTERY DISEASE: Status: ACTIVE | Noted: 2022-08-08

## 2023-10-29 PROBLEM — I25.10 ATHEROSCLEROSIS OF CORONARY ARTERY: Status: ACTIVE | Noted: 2022-09-05

## 2023-10-29 PROBLEM — R94.31 ABNORMAL ELECTROCARDIOGRAM: Status: ACTIVE | Noted: 2022-06-09

## 2023-10-29 PROBLEM — I34.0 MILD MITRAL REGURGITATION: Status: ACTIVE | Noted: 2022-08-04

## 2023-10-29 PROBLEM — K29.70 HELICOBACTER PYLORI GASTRITIS: Status: ACTIVE | Noted: 2023-10-29

## 2023-10-29 PROBLEM — R00.2 PALPITATIONS: Status: ACTIVE | Noted: 2023-10-05

## 2023-11-13 ENCOUNTER — APPOINTMENT (OUTPATIENT)
Dept: HEART AND VASCULAR | Facility: CLINIC | Age: 78
End: 2023-11-13
Payer: MEDICARE

## 2023-11-13 PROCEDURE — XXXXX: CPT | Mod: 1L

## 2023-11-15 LAB
CHOLEST SERPL-MCNC: 230 MG/DL
HDLC SERPL-MCNC: 60 MG/DL
LDLC SERPL CALC-MCNC: 156 MG/DL
NONHDLC SERPL-MCNC: 170 MG/DL
TRIGL SERPL-MCNC: 77 MG/DL

## 2023-11-17 ENCOUNTER — NON-APPOINTMENT (OUTPATIENT)
Age: 78
End: 2023-11-17

## 2023-11-17 RX ORDER — EVOLOCUMAB 140 MG/ML
140 INJECTION, SOLUTION SUBCUTANEOUS
Qty: 2 | Refills: 11 | Status: ACTIVE | COMMUNITY
Start: 2023-11-17 | End: 1900-01-01

## 2023-11-20 ENCOUNTER — RESULT REVIEW (OUTPATIENT)
Age: 78
End: 2023-11-20

## 2023-11-20 ENCOUNTER — APPOINTMENT (OUTPATIENT)
Dept: HEMATOLOGY ONCOLOGY | Facility: CLINIC | Age: 78
End: 2023-11-20
Payer: MEDICARE

## 2023-11-20 VITALS
RESPIRATION RATE: 16 BRPM | OXYGEN SATURATION: 100 % | SYSTOLIC BLOOD PRESSURE: 159 MMHG | DIASTOLIC BLOOD PRESSURE: 72 MMHG | HEIGHT: 68 IN | BODY MASS INDEX: 28.34 KG/M2 | TEMPERATURE: 97.5 F | HEART RATE: 65 BPM | WEIGHT: 187 LBS

## 2023-11-20 DIAGNOSIS — Z00.00 ENCOUNTER FOR GENERAL ADULT MEDICAL EXAMINATION W/OUT ABNORMAL FINDINGS: ICD-10-CM

## 2023-11-20 DIAGNOSIS — D64.9 ANEMIA, UNSPECIFIED: ICD-10-CM

## 2023-11-20 DIAGNOSIS — E53.8 DEFICIENCY OF OTHER SPECIFIED B GROUP VITAMINS: ICD-10-CM

## 2023-11-20 PROCEDURE — 36415 COLL VENOUS BLD VENIPUNCTURE: CPT

## 2023-11-20 PROCEDURE — 99214 OFFICE O/P EST MOD 30 MIN: CPT | Mod: 25

## 2023-12-07 ENCOUNTER — NON-APPOINTMENT (OUTPATIENT)
Age: 78
End: 2023-12-07

## 2024-04-11 ENCOUNTER — APPOINTMENT (OUTPATIENT)
Dept: HEART AND VASCULAR | Facility: CLINIC | Age: 79
End: 2024-04-11

## 2024-06-25 ENCOUNTER — RESULT REVIEW (OUTPATIENT)
Age: 79
End: 2024-06-25

## 2024-06-25 ENCOUNTER — APPOINTMENT (OUTPATIENT)
Dept: HEMATOLOGY ONCOLOGY | Facility: CLINIC | Age: 79
End: 2024-06-25
Payer: MEDICARE

## 2024-06-25 VITALS
DIASTOLIC BLOOD PRESSURE: 63 MMHG | WEIGHT: 197 LBS | HEIGHT: 68 IN | RESPIRATION RATE: 16 BRPM | BODY MASS INDEX: 29.86 KG/M2 | TEMPERATURE: 98 F | HEART RATE: 64 BPM | SYSTOLIC BLOOD PRESSURE: 132 MMHG | OXYGEN SATURATION: 98 %

## 2024-06-25 DIAGNOSIS — R76.0 RAISED ANTIBODY TITER: ICD-10-CM

## 2024-06-25 DIAGNOSIS — I82.409 ACUTE EMBOLISM AND THROMBOSIS OF UNSPECIFIED DEEP VEINS OF UNSPECIFIED LOWER EXTREMITY: ICD-10-CM

## 2024-06-25 DIAGNOSIS — I26.99 OTHER PULMONARY EMBOLISM W/OUT ACUTE COR PULMONALE: ICD-10-CM

## 2024-06-25 DIAGNOSIS — D68.62 LUPUS ANTICOAGULANT SYNDROME: ICD-10-CM

## 2024-06-25 PROCEDURE — 36415 COLL VENOUS BLD VENIPUNCTURE: CPT

## 2024-06-25 PROCEDURE — 99214 OFFICE O/P EST MOD 30 MIN: CPT

## 2024-06-25 RX ORDER — IRON/IRON ASP GLY/FA/MV-MIN 38 125-25-1MG
TABLET ORAL
Refills: 0 | Status: COMPLETED | COMMUNITY
End: 2024-06-25

## 2024-06-25 RX ORDER — ATORVASTATIN CALCIUM 80 MG/1
TABLET, FILM COATED ORAL
Refills: 0 | Status: ACTIVE | COMMUNITY

## 2024-06-25 NOTE — HISTORY OF PRESENT ILLNESS
[de-identified] : Pt presents today for follow up of PE - on xarelto 10mg PO. denies bleeding issues.  Patient overall feels well except she has had worsening sciatica pain and is undergoing PT as of right now.  [de-identified] : 76 year old female presents today for initial consultation of hypercoagulability, referred by Dr. Sena.  She presented to [facility] ER back in 2022 with complaints of shortness of breath while climbing stairs.  She was found to have both a DVT and a pulmonary embolus, she is currently on Xarelto 20mg daily.  Due to this being an unprovoked event she was referred for further workup.  She denies any recent or prolonged travel or injury prior to this even occurring.  She denies any history of clotting in the past, or any major surgical procedures.  \par  \par  Venous Duplex -  - DVT of gastrocnemius and peroneal vein left\par  CTA - - bilateral scattered PE. \par  \par  Family Hx-\par  Denies any family hx of thrombosis or frequent miscarriages, never got pregnant\par  Mother with pancreatic cancer-  at 77\par  Maternal Aunt - pancreatic ca 70 \par  Sister with a "abdominal" cancer diagnosed 82- doing well\par  \par  Social Hx-\par  Denies Smoking Hx\par  Social ETOH use\par  :

## 2024-06-25 NOTE — PHYSICAL EXAM
[Fully active, able to carry on all pre-disease performance without restriction] : Status 0 - Fully active, able to carry on all pre-disease performance without restriction [Normal] : affect appropriate [de-identified] : varicose veins

## 2024-06-25 NOTE — ASSESSMENT
[Designated Health Care Proxy] : Designated Health Care Proxy [Name: ___] : Name: [unfilled] [Relationship: ___] : Relationship: [unfilled] [FreeTextEntry1] : 76 yo female referred for evaluation of DVT/ PE diagnosed April 2022. LLE DVt - proximal gastrocnemius/ peroneal. Patient had bilateral PE, multiple scattered/ subsegmental.  No FH or personal h/o of DVT/ PE. She never conceived, has adopted children. Denies weight loss.  March 2022- drove to [place] x 2-3 hours and was sitting there for few days while visiting family, which doesn't seem to be strong provoking circumstances. During todays - 2nd visit she recalls being SOB before the trip to [place]. Hypercoagulable work up positive for diluted Brad viper venom time - patient on Xarelto, will need to repeat results when off Xarelto. DAVID IgG positive - 18.1 ( 2nd test).  Cardiac catheterization done.  Patient 7 months on AC - LAC x 2 positive and positive DAVID- stop Xarelto x 5 days - repeat blood work, resume after. Set up telehealth after to review results.  Reviewed with patient - if positive for LAC will need to stay on AC, she is hesitant to change to warfarin, but since responded well to Xarelto might stay on it. will require window off AC to repeat LAC  LAC positive, DAVID - low titer, - reviewed with patient to resume AC. She will remain on Xarelto. In vewi of recent GI bleeding decrease dose to 10 mg - LAC still present - in view of recent bleeding will change to Xarelto 10 mg daily  #anemia- Hgb 9.6, s/p GI bleed in May 2023. S/p EGD, colonoscopy and capsule study. Polyp was a tubular adenoma (5/23/23) and she had 2 small angiodysplasia lesions removed, they were not bleeding. H.pylori- abx x 2- recent test - negative  # Anemia of blood loss - oral iron ferrous gluconate, doesn't tolerate well, developed constipation. If anemic again, will do IV iron - Hg increased to 12, pending ferritin   # vit B12 deficiency - start B12 1000 mcg. - check level   # FH - mother and maternal aunt - pancreatic ca, mother- AA Genetic testing negative - reviewed report and implication with patient  # Sciatica- referred to pain management   RTC 6 m [AdvancecareDate] : 11/20/2023 [FreeTextEntry2] : patient to defer wishes at this time. Will follow up on a later date

## 2024-06-25 NOTE — REVIEW OF SYSTEMS
[Negative] : Allergic/Immunologic [Joint Stiffness] : joint stiffness [Muscle Pain] : muscle pain [FreeTextEntry9] : sciatica pain

## 2024-12-21 ENCOUNTER — APPOINTMENT (OUTPATIENT)
Dept: AFTER HOURS CARE | Facility: EMERGENCY ROOM | Age: 79
End: 2024-12-21
Payer: MEDICARE

## 2024-12-21 DIAGNOSIS — N12 TUBULO-INTERSTITIAL NEPHRITIS, NOT SPECIFIED AS ACUTE OR CHRONIC: ICD-10-CM

## 2024-12-21 PROCEDURE — 99443: CPT | Mod: NC,93

## 2025-01-06 ENCOUNTER — APPOINTMENT (OUTPATIENT)
Dept: HEMATOLOGY ONCOLOGY | Facility: CLINIC | Age: 80
End: 2025-01-06

## 2025-02-25 ENCOUNTER — RESULT REVIEW (OUTPATIENT)
Age: 80
End: 2025-02-25

## 2025-02-25 ENCOUNTER — APPOINTMENT (OUTPATIENT)
Dept: HEMATOLOGY ONCOLOGY | Facility: CLINIC | Age: 80
End: 2025-02-25
Payer: MEDICARE

## 2025-02-25 VITALS
SYSTOLIC BLOOD PRESSURE: 146 MMHG | RESPIRATION RATE: 16 BRPM | DIASTOLIC BLOOD PRESSURE: 69 MMHG | BODY MASS INDEX: 28.23 KG/M2 | HEIGHT: 68 IN | OXYGEN SATURATION: 100 % | TEMPERATURE: 98 F | WEIGHT: 186.25 LBS | HEART RATE: 64 BPM

## 2025-02-25 DIAGNOSIS — D68.62 LUPUS ANTICOAGULANT SYNDROME: ICD-10-CM

## 2025-02-25 DIAGNOSIS — I82.409 ACUTE EMBOLISM AND THROMBOSIS OF UNSPECIFIED DEEP VEINS OF UNSPECIFIED LOWER EXTREMITY: ICD-10-CM

## 2025-02-25 DIAGNOSIS — D64.9 ANEMIA, UNSPECIFIED: ICD-10-CM

## 2025-02-25 DIAGNOSIS — I26.99 OTHER PULMONARY EMBOLISM W/OUT ACUTE COR PULMONALE: ICD-10-CM

## 2025-02-25 PROCEDURE — 36415 COLL VENOUS BLD VENIPUNCTURE: CPT

## 2025-02-25 PROCEDURE — 99213 OFFICE O/P EST LOW 20 MIN: CPT

## 2025-04-24 ENCOUNTER — APPOINTMENT (OUTPATIENT)
Dept: ORTHOPEDIC SURGERY | Facility: CLINIC | Age: 80
End: 2025-04-24

## 2025-04-24 VITALS
HEART RATE: 75 BPM | TEMPERATURE: 97.5 F | SYSTOLIC BLOOD PRESSURE: 133 MMHG | WEIGHT: 185.25 LBS | RESPIRATION RATE: 17 BRPM | HEIGHT: 68 IN | DIASTOLIC BLOOD PRESSURE: 78 MMHG | OXYGEN SATURATION: 98 % | BODY MASS INDEX: 28.08 KG/M2

## 2025-04-24 DIAGNOSIS — M43.10 SPONDYLOLISTHESIS, SITE UNSPECIFIED: ICD-10-CM

## 2025-04-24 DIAGNOSIS — M48.062 SPINAL STENOSIS, LUMBAR REGION WITH NEUROGENIC CLAUDICATION: ICD-10-CM

## 2025-04-24 DIAGNOSIS — M54.50 LOW BACK PAIN, UNSPECIFIED: ICD-10-CM

## 2025-04-24 PROCEDURE — 72110 X-RAY EXAM L-2 SPINE 4/>VWS: CPT

## 2025-04-24 PROCEDURE — 99204 OFFICE O/P NEW MOD 45 MIN: CPT | Mod: 25

## 2025-04-24 RX ORDER — DIAZEPAM 5 MG/1
5 TABLET ORAL
Qty: 4 | Refills: 0 | Status: ACTIVE | COMMUNITY
Start: 2025-04-24 | End: 1900-01-01

## 2025-04-24 RX ORDER — PREDNISONE 10 MG/1
10 TABLET ORAL DAILY
Qty: 14 | Refills: 0 | Status: ACTIVE | COMMUNITY
Start: 2025-04-24 | End: 1900-01-01

## 2025-04-30 ENCOUNTER — RESULT REVIEW (OUTPATIENT)
Age: 80
End: 2025-04-30

## 2025-05-07 ENCOUNTER — NON-APPOINTMENT (OUTPATIENT)
Age: 80
End: 2025-05-07

## 2025-05-08 ENCOUNTER — APPOINTMENT (OUTPATIENT)
Dept: ORTHOPEDIC SURGERY | Facility: CLINIC | Age: 80
End: 2025-05-08

## 2025-05-20 ENCOUNTER — APPOINTMENT (OUTPATIENT)
Dept: PAIN MANAGEMENT | Facility: CLINIC | Age: 80
End: 2025-05-20
Payer: MEDICARE

## 2025-05-20 VITALS
HEART RATE: 62 BPM | DIASTOLIC BLOOD PRESSURE: 72 MMHG | OXYGEN SATURATION: 97 % | SYSTOLIC BLOOD PRESSURE: 166 MMHG | WEIGHT: 185 LBS | HEIGHT: 68 IN | BODY MASS INDEX: 28.04 KG/M2

## 2025-05-20 DIAGNOSIS — M54.16 RADICULOPATHY, LUMBAR REGION: ICD-10-CM

## 2025-05-20 DIAGNOSIS — M48.062 SPINAL STENOSIS, LUMBAR REGION WITH NEUROGENIC CLAUDICATION: ICD-10-CM

## 2025-05-20 PROCEDURE — 99204 OFFICE O/P NEW MOD 45 MIN: CPT

## 2025-06-06 ENCOUNTER — APPOINTMENT (OUTPATIENT)
Dept: PAIN MANAGEMENT | Facility: HOSPITAL | Age: 80
End: 2025-06-06

## 2025-06-06 ENCOUNTER — TRANSCRIPTION ENCOUNTER (OUTPATIENT)
Age: 80
End: 2025-06-06